# Patient Record
Sex: MALE | Race: WHITE | NOT HISPANIC OR LATINO | Employment: UNEMPLOYED | URBAN - METROPOLITAN AREA
[De-identification: names, ages, dates, MRNs, and addresses within clinical notes are randomized per-mention and may not be internally consistent; named-entity substitution may affect disease eponyms.]

---

## 2019-02-14 ENCOUNTER — OFFICE VISIT (OUTPATIENT)
Dept: NEUROSURGERY | Facility: CLINIC | Age: 49
End: 2019-02-14

## 2019-02-14 VITALS — TEMPERATURE: 98.5 F | WEIGHT: 228 LBS | HEIGHT: 75 IN | BODY MASS INDEX: 28.35 KG/M2

## 2019-02-14 DIAGNOSIS — M51.36 DDD (DEGENERATIVE DISC DISEASE), LUMBAR: Primary | ICD-10-CM

## 2019-02-14 PROCEDURE — 99243 OFF/OP CNSLTJ NEW/EST LOW 30: CPT | Performed by: NEUROLOGICAL SURGERY

## 2019-02-14 RX ORDER — LISINOPRIL 5 MG/1
TABLET ORAL
Refills: 5 | COMMUNITY
Start: 2019-02-08

## 2019-02-14 NOTE — PROGRESS NOTES
Subjective   Patient ID: Sesar Sutton is a 48 y.o. male is being seen for consultation today at the request of Jaqui Vargas PA-C  Chief Complaint: Back pain    History of Present Illness: The patient is a 48-year-old man who has a history of lower back pain that has been present for 10 years but more constant and worse in the past 3 years.  It bothers him at all times, whether standing, sitting still, walking, or even lying down.  He has been through injection therapies and physical therapy.  He is not working at present related to symptoms.  Current medication is lisinopril.    Review of Radiographic Studies:  Lumbar MRI scan was reviewed showing an advanced degenerative disc at L4-5 and early degenerative disc at L5-S1.  There is no nerve root compression, no spondylolisthesis, no scoliosis.    The following portions of the patient's history were reviewed, updated as appropriate and approved: allergies, current medications, past family history, past medical history, past social history, past surgical history, review of systems and problem list.  Review of Systems   Constitutional: Negative for activity change, appetite change, chills, diaphoresis, fatigue, fever and unexpected weight change.   HENT: Negative for congestion, dental problem, drooling, ear discharge, ear pain, facial swelling, hearing loss, mouth sores, nosebleeds, postnasal drip, rhinorrhea, sinus pressure, sneezing, sore throat, tinnitus, trouble swallowing and voice change.    Eyes: Negative for photophobia, pain, discharge, redness, itching and visual disturbance.   Respiratory: Negative for apnea, cough, choking, chest tightness, shortness of breath, wheezing and stridor.    Cardiovascular: Negative for chest pain, palpitations and leg swelling.   Gastrointestinal: Negative for abdominal distention, abdominal pain, anal bleeding, blood in stool, constipation, diarrhea, nausea, rectal pain and vomiting.   Endocrine: Negative for cold  intolerance, heat intolerance, polydipsia, polyphagia and polyuria.   Genitourinary: Negative for decreased urine volume, difficulty urinating, dysuria, enuresis, flank pain, frequency, genital sores, hematuria and urgency.   Musculoskeletal: Positive for back pain. Negative for arthralgias, gait problem, joint swelling, myalgias, neck pain and neck stiffness.   Skin: Negative for color change, pallor, rash and wound.   Allergic/Immunologic: Negative for environmental allergies, food allergies and immunocompromised state.   Neurological: Negative for dizziness, tremors, seizures, syncope, facial asymmetry, speech difficulty, weakness, light-headedness, numbness and headaches.   Hematological: Negative for adenopathy. Does not bruise/bleed easily.   Psychiatric/Behavioral: Negative for agitation, behavioral problems, confusion, decreased concentration, dysphoric mood, hallucinations, self-injury, sleep disturbance and suicidal ideas. The patient is not nervous/anxious and is not hyperactive.        Objective     NEUROLOGICAL EXAMINATION:      MENTAL STATUS:  Alert and oriented.  Speech intact.  Recent and remote memory intact.      CRANIAL NERVES:  Cranial nerve II:  Visual fields are full.  Cranial nerves III, IV and VI:  PERRLADC.  Extraocular movements are intact.  Nystagmus is not present.  Cranial nerve V:  Facial sensation is intact.  Cranial nerve VII:  Muscles of facial expression reveal no asymmetry.  Cranial nerve VIII:  Hearing is intact.  Cranial nerves IX and X:  Palate elevates symmetrically.  Cranial nerve XI:  Shoulder shrug is intact.  Cranial nerve XII:  Tongue is midline without evidence of atrophy or fasciculation.    MUSCULOSKELETAL:  [SLR negative. Adin's test negative.]    MOTOR: Intact dorsiflexion and plantar flexion in both lower extremities.    SENSATION: No loss of sensation in either lower extremity.    REFLEXES:  DTR symmetrical intact bilateral lower extremities.    Assessment   Low  back pain associated with advanced degenerative disc at L4-5.       Plan   The only surgical solution for this problem is fusion of the L4-5 interspace.  The success rate is low, the complication rate can be high, and the surgery does not prevent additional changes of degeneration in the adjacent disc spaces, particularly L5-S1.  A nonsurgical approach is preferable, but that means continuing to treat this medically with the approaches such as injections or therapy that have had little success at this point.  Nevertheless I do not recommend surgery at present time.       Sherif Lee MD